# Patient Record
Sex: MALE | Race: WHITE | NOT HISPANIC OR LATINO | URBAN - METROPOLITAN AREA
[De-identification: names, ages, dates, MRNs, and addresses within clinical notes are randomized per-mention and may not be internally consistent; named-entity substitution may affect disease eponyms.]

---

## 2017-01-14 ENCOUNTER — EMERGENCY (EMERGENCY)
Facility: HOSPITAL | Age: 37
LOS: 1 days | Discharge: HOME | End: 2017-01-14
Attending: EMERGENCY MEDICINE | Admitting: EMERGENCY MEDICINE
Payer: COMMERCIAL

## 2017-01-14 VITALS
OXYGEN SATURATION: 99 % | HEART RATE: 100 BPM | TEMPERATURE: 99 F | DIASTOLIC BLOOD PRESSURE: 73 MMHG | SYSTOLIC BLOOD PRESSURE: 157 MMHG | WEIGHT: 139.99 LBS | RESPIRATION RATE: 18 BRPM

## 2017-01-14 VITALS
OXYGEN SATURATION: 99 % | DIASTOLIC BLOOD PRESSURE: 72 MMHG | RESPIRATION RATE: 18 BRPM | HEART RATE: 68 BPM | SYSTOLIC BLOOD PRESSURE: 106 MMHG | TEMPERATURE: 98 F

## 2017-01-14 LAB
ALBUMIN SERPL ELPH-MCNC: 3.5 G/DL — SIGNIFICANT CHANGE UP (ref 3.4–5)
ALP SERPL-CCNC: 54 U/L — SIGNIFICANT CHANGE UP (ref 40–120)
ALT FLD-CCNC: 30 U/L — SIGNIFICANT CHANGE UP (ref 12–42)
ANION GAP SERPL CALC-SCNC: 8 MMOL/L — LOW (ref 9–16)
APTT BLD: 35 SEC — SIGNIFICANT CHANGE UP (ref 27.5–37.4)
AST SERPL-CCNC: 15 U/L — SIGNIFICANT CHANGE UP (ref 15–37)
BILIRUB SERPL-MCNC: 0.4 MG/DL — SIGNIFICANT CHANGE UP (ref 0.2–1.2)
BUN SERPL-MCNC: 7 MG/DL — SIGNIFICANT CHANGE UP (ref 7–23)
CALCIUM SERPL-MCNC: 8.3 MG/DL — LOW (ref 8.5–10.5)
CHLORIDE SERPL-SCNC: 108 MMOL/L — SIGNIFICANT CHANGE UP (ref 96–108)
CO2 SERPL-SCNC: 25 MMOL/L — SIGNIFICANT CHANGE UP (ref 22–31)
CREAT SERPL-MCNC: 1.02 MG/DL — SIGNIFICANT CHANGE UP (ref 0.5–1.3)
GLUCOSE SERPL-MCNC: 84 MG/DL — SIGNIFICANT CHANGE UP (ref 70–99)
HCT VFR BLD CALC: 41.7 % — SIGNIFICANT CHANGE UP (ref 39–50)
HGB BLD-MCNC: 14.9 G/DL — SIGNIFICANT CHANGE UP (ref 13–17)
INR BLD: 1.16 — SIGNIFICANT CHANGE UP (ref 0.88–1.16)
LACTATE SERPL-SCNC: 1 MMOL/L — SIGNIFICANT CHANGE UP (ref 0.5–2)
LYMPHOCYTES # BLD AUTO: 14 % — SIGNIFICANT CHANGE UP (ref 13–44)
MCHC RBC-ENTMCNC: 33.4 PG — SIGNIFICANT CHANGE UP (ref 27–34)
MCHC RBC-ENTMCNC: 35.7 G/DL — SIGNIFICANT CHANGE UP (ref 32–36)
MCV RBC AUTO: 93.5 FL — SIGNIFICANT CHANGE UP (ref 80–100)
MONOCYTES NFR BLD AUTO: 8 % — SIGNIFICANT CHANGE UP (ref 2–14)
NEUTROPHILS NFR BLD AUTO: 62 % — SIGNIFICANT CHANGE UP (ref 43–77)
PLATELET # BLD AUTO: 112 K/UL — LOW (ref 150–400)
POTASSIUM SERPL-MCNC: 4.2 MMOL/L — SIGNIFICANT CHANGE UP (ref 3.5–5.3)
POTASSIUM SERPL-SCNC: 4.2 MMOL/L — SIGNIFICANT CHANGE UP (ref 3.5–5.3)
PROT SERPL-MCNC: 7.3 G/DL — SIGNIFICANT CHANGE UP (ref 6.4–8.2)
PROTHROM AB SERPL-ACNC: 12.9 SEC — SIGNIFICANT CHANGE UP (ref 10–13.1)
RBC # BLD: 4.46 M/UL — SIGNIFICANT CHANGE UP (ref 4.2–5.8)
RBC # FLD: 12.5 % — SIGNIFICANT CHANGE UP (ref 10.3–16.9)
SODIUM SERPL-SCNC: 141 MMOL/L — SIGNIFICANT CHANGE UP (ref 135–145)
WBC # BLD: 6.6 K/UL — SIGNIFICANT CHANGE UP (ref 3.8–10.5)
WBC # FLD AUTO: 6.6 K/UL — SIGNIFICANT CHANGE UP (ref 3.8–10.5)

## 2017-01-14 PROCEDURE — 74177 CT ABD & PELVIS W/CONTRAST: CPT | Mod: 26

## 2017-01-14 PROCEDURE — 85730 THROMBOPLASTIN TIME PARTIAL: CPT

## 2017-01-14 PROCEDURE — 74177 CT ABD & PELVIS W/CONTRAST: CPT

## 2017-01-14 PROCEDURE — 99284 EMERGENCY DEPT VISIT MOD MDM: CPT | Mod: 25

## 2017-01-14 PROCEDURE — 99285 EMERGENCY DEPT VISIT HI MDM: CPT

## 2017-01-14 PROCEDURE — 83605 ASSAY OF LACTIC ACID: CPT

## 2017-01-14 PROCEDURE — 80053 COMPREHEN METABOLIC PANEL: CPT

## 2017-01-14 PROCEDURE — 85025 COMPLETE CBC W/AUTO DIFF WBC: CPT

## 2017-01-14 PROCEDURE — 85610 PROTHROMBIN TIME: CPT

## 2017-01-14 RX ORDER — LIDOCAINE 4 G/100G
1 CREAM TOPICAL ONCE
Qty: 0 | Refills: 0 | Status: DISCONTINUED | OUTPATIENT
Start: 2017-01-14 | End: 2017-01-18

## 2017-01-14 RX ORDER — METRONIDAZOLE 500 MG
1 TABLET ORAL
Qty: 30 | Refills: 0 | OUTPATIENT
Start: 2017-01-14 | End: 2017-01-24

## 2017-01-14 RX ORDER — IOHEXOL 300 MG/ML
50 INJECTION, SOLUTION INTRAVENOUS ONCE
Qty: 0 | Refills: 0 | Status: COMPLETED | OUTPATIENT
Start: 2017-01-14 | End: 2017-01-14

## 2017-01-14 RX ORDER — DOCUSATE SODIUM 100 MG
1 CAPSULE ORAL
Qty: 30 | Refills: 0 | OUTPATIENT
Start: 2017-01-14

## 2017-01-14 RX ORDER — SODIUM CHLORIDE 9 MG/ML
1000 INJECTION INTRAMUSCULAR; INTRAVENOUS; SUBCUTANEOUS ONCE
Qty: 0 | Refills: 0 | Status: COMPLETED | OUTPATIENT
Start: 2017-01-14 | End: 2017-01-14

## 2017-01-14 RX ORDER — CIPROFLOXACIN LACTATE 400MG/40ML
1 VIAL (ML) INTRAVENOUS
Qty: 20 | Refills: 0 | OUTPATIENT
Start: 2017-01-14 | End: 2017-01-24

## 2017-01-14 RX ORDER — LIDOCAINE 4 G/100G
1 CREAM TOPICAL
Qty: 1 | Refills: 0 | OUTPATIENT
Start: 2017-01-14 | End: 2017-01-28

## 2017-01-14 RX ADMIN — IOHEXOL 50 MILLILITER(S): 300 INJECTION, SOLUTION INTRAVENOUS at 13:15

## 2017-01-14 RX ADMIN — SODIUM CHLORIDE 2000 MILLILITER(S): 9 INJECTION INTRAMUSCULAR; INTRAVENOUS; SUBCUTANEOUS at 13:16

## 2017-01-14 NOTE — ED PROVIDER NOTE - OBJECTIVE STATEMENT
having rectal pain and bleeding bright red blood for 5 days.  painful to defecate.  just blood into toilet.  has some small hard bowel movements, but bleeds even without passing stool.  pt with "flu like" symptoms (body aches, nausea) for about same duration. denies fever.  no abdominal pain or vomiting.  no  symptoms.  denies any foreign body insertion or receptive anal intercourse.   hx of duodenal ulcer apx 12 yrs ago but never had colonoscopy. having rectal pain and bleeding bright red blood for 5 days.  painful to defecate.  just blood into toilet.  has some small hard bowel movements, but bleeds even without passing stool.  pt with "flu like" symptoms (body aches, nausea) for about same duration. denies fever.  no abdominal pain or vomiting.  no  symptoms.  denies any foreign body insertion or receptive anal intercourse.   hx of duodenal ulcer apx 12 yrs ago but never had colonoscopy.  no known family hx of crohn or UC.

## 2017-01-14 NOTE — ED PROVIDER NOTE - ATTENDING CONTRIBUTION TO CARE
35 yo male with rectal pain and bleeding  x 2 days assoc  with painful defacation- no prior hx of hemmhoroids -no anal FB or receptive anal intercourse- no assoc  fevers or chills, but mild nausea- no vomiting, no external hemm on exam- CT ? proctocolitis no obvious perianal abscess  will rx with cipro flagyl sitz baths and anusol hc and lidocaine for pain with GI/ Colorectal surgery to see pt in 2 days - return precautions d/w pt including fevers chills vomiting increased bleeding

## 2017-01-14 NOTE — ED ADULT NURSE NOTE - OBJECTIVE STATEMENT
Pt presents to ED A&Ox4 c/o rectal pain and bleeding x 5 days. Pt states blood is bright red, sometimes in toilet and sometime noted on toilet paper. Pt denies having hemorrhoids. Pt denies CP/SOB, fevers, n/v/d. NAD noted.

## 2017-01-14 NOTE — ED PROVIDER NOTE - GASTROINTESTINAL, MLM
Abdomen soft, non-tender, no guarding.  Rectal: exquisitely tender, pt pulling examining hand away.   Finger comes out bloody after MARIETTA.  No masses appreciated on the limited MARIETTA.  Unable to pass anosope due to pain.

## 2017-01-18 DIAGNOSIS — K62.89 OTHER SPECIFIED DISEASES OF ANUS AND RECTUM: ICD-10-CM

## 2017-01-18 DIAGNOSIS — R53.81 OTHER MALAISE: ICD-10-CM

## 2018-12-04 NOTE — ED PROVIDER NOTE - MEDICAL DECISION MAKING DETAILS
Patient Instructions by Tyree Burks MD at 08/11/18 10:19 AM     Author:  Tyree Burks MD Service:  (none) Author Type:  Physician     Filed:  08/11/18 10:21 AM Encounter Date:  8/11/2018 Status:  Addendum     :  Tyree Burks MD (Physician)            PATIENT INFORMATION        Follow-Up  - Return in one year for your next complete exam.    Additional Educational Resources:  For additional resources regarding your symptoms, diagnosis, or further health information, please visit the Health Resources section on [email or web address] or the Online Health Resources section in [email or web address].      PATIENT INFORMATION  Follow-Up  - Return in 1 year for your yearly well visit.    13-17 years old Health and Safety Tips - The following hyperlinks are available to access via [employer]    Bright Futures 11-14 Years Old Parent Education  Bright Futures 11-14 Years Old Child Education    Futuros Brillantes 11 a 14 años de edad (Educación para los padres) - Español  Futuros Brillantes 11 a 14 años de edad (Educación para los niños) - Español    Bright Futures 15-17 Years Old Parent Education  Bright Futures 15-17 Years Old Child Education    Futuros Brillantes 15 a 17 años de edad (Educación para los padres) - Español  Futuros Brillantes 15 a 17 años de edad (Educación para los niños) - Español    Additional Educational Resources:  For additional resources regarding your symptoms, diagnosis, or further health information, please visit the Discover a Healthier You section on [email or web address] or the Online Health Resources section in [email or web address].      Revision History        User Key Date/Time User Provider Type Action    > [N/A] 08/11/18 10:21 AM Tyree Burks MD Physician Addend     [N/A] 08/11/18 10:20 AM Tyree Burks MD Physician Addend     [N/A] 08/11/18 10:20 AM Tyree Burks MD Physician Sign            
Rectal pain and bleeding.  Temp in ED 99.4 and "flu like symptpoms." Limited exam due to pain but + BRBPR on exam. Check labs and CT scan.

## 2021-08-19 NOTE — ED PROVIDER NOTE - NS ED ATTENDING STATEMENT MOD
Patient Needs Assistance to Leave Residence... I have personally performed a face to face diagnostic evaluation on this patient. I have reviewed the PA note and agree with the history, exam, and plan of care, except as noted.